# Patient Record
Sex: FEMALE | Race: WHITE | Employment: UNEMPLOYED | URBAN - METROPOLITAN AREA
[De-identification: names, ages, dates, MRNs, and addresses within clinical notes are randomized per-mention and may not be internally consistent; named-entity substitution may affect disease eponyms.]

---

## 2018-01-09 ENCOUNTER — TRANSFERRED RECORDS (OUTPATIENT)
Dept: HEALTH INFORMATION MANAGEMENT | Facility: CLINIC | Age: 18
End: 2018-01-09

## 2018-01-10 ENCOUNTER — RADIANT APPOINTMENT (OUTPATIENT)
Dept: CARDIOLOGY | Facility: CLINIC | Age: 18
End: 2018-01-10
Payer: COMMERCIAL

## 2018-01-10 ENCOUNTER — PRE VISIT (OUTPATIENT)
Dept: CARDIOLOGY | Facility: CLINIC | Age: 18
End: 2018-01-10

## 2018-01-10 ENCOUNTER — OFFICE VISIT (OUTPATIENT)
Dept: CARDIOLOGY | Facility: CLINIC | Age: 18
End: 2018-01-10
Attending: INTERNAL MEDICINE
Payer: COMMERCIAL

## 2018-01-10 VITALS
HEART RATE: 80 BPM | SYSTOLIC BLOOD PRESSURE: 131 MMHG | DIASTOLIC BLOOD PRESSURE: 73 MMHG | HEIGHT: 64 IN | OXYGEN SATURATION: 100 % | BODY MASS INDEX: 19.21 KG/M2 | WEIGHT: 112.5 LBS

## 2018-01-10 DIAGNOSIS — R55 SYNCOPE, UNSPECIFIED SYNCOPE TYPE: ICD-10-CM

## 2018-01-10 DIAGNOSIS — I49.9 IRREGULAR HEART BEAT: Primary | ICD-10-CM

## 2018-01-10 PROCEDURE — 93270 REMOTE 30 DAY ECG REV/REPORT: CPT | Mod: ZF

## 2018-01-10 PROCEDURE — 99203 OFFICE O/P NEW LOW 30 MIN: CPT | Mod: 25 | Performed by: INTERNAL MEDICINE

## 2018-01-10 PROCEDURE — 93010 ELECTROCARDIOGRAM REPORT: CPT | Mod: ZP | Performed by: INTERNAL MEDICINE

## 2018-01-10 PROCEDURE — 93228 REMOTE 30 DAY ECG REV/REPORT: CPT | Performed by: INTERNAL MEDICINE

## 2018-01-10 PROCEDURE — G0463 HOSPITAL OUTPT CLINIC VISIT: HCPCS | Mod: 25,ZF

## 2018-01-10 PROCEDURE — 93005 ELECTROCARDIOGRAM TRACING: CPT | Mod: ZF

## 2018-01-10 RX ORDER — LEVONORGESTREL/ETHIN.ESTRADIOL 0.1-0.02MG
TABLET ORAL
COMMUNITY
Start: 2016-12-27

## 2018-01-10 ASSESSMENT — PAIN SCALES - GENERAL: PAINLEVEL: NO PAIN (0)

## 2018-01-10 NOTE — MR AVS SNAPSHOT
"              After Visit Summary   1/10/2018    Lay Stahl    MRN: [id number]           Patient Information     Date Of Birth          2000        Visit Information        Provider Department      1/10/2018 4:00 PM Tech, Uc Cvc Monitor, [facility]        Today's Diagnoses     Syncope, unspecified syncope type           Follow-ups after your visit        Your next 10 appointments already scheduled     Jan 16, 2018  3:00 PM CST   New Visit with Ac Stock MD   [facility] ([address] [address] [phone]              Who to contact     If you have questions or need follow up information about today's clinic visit or your schedule please contact [facility] directly at [phone].  Normal or non-critical lab and imaging results will be communicated to you by MyChart, letter or phone within 4 business days after the clinic has received the results. If you do not hear from us within 7 days, please contact the clinic through MyChart or phone. If you have a critical or abnormal lab result, we will notify you by phone as soon as possible.  Submit refill requests through Flumes or call your pharmacy and they will forward the refill request to us. Please allow 3 business days for your refill to be completed.          Additional Information About Your Visit        MyChart Information     Flumes lets you send messages to your doctor, view your test results, renew your prescriptions, schedule appointments and more. To sign up, go to [email or web address] . Click on \"Log in\" on the left side of the screen, which will take you to the Welcome page. Then click on \"Sign up Now\" on the right side of the page.     You will be asked to enter the access code listed below, as well as some personal information. Please follow the directions to create your username and password.     Your access code " is: [email or web address]  Expires: 4/10/2018  6:30 AM     Your access code will  in 90 days. If you need help or a new code, please call your [facility] clinic or [phone].        Care EveryWhere ID     This is your Care EveryWhere ID. This could be used by other organizations to access your [facility] medical records  [id number]         Blood Pressure from Last 3 Encounters:   01/10/18 131/73   02/02/15 120/63   10/06/14 112/67    Weight from Last 3 Encounters:   01/10/18 51 kg (112 lb 8 oz) (20 %)*   02/02/15 51 kg (112 lb 7 oz) (33 %)*   10/06/14 49.7 kg (109 lb 9.1 oz) (28 %)*     * Growth percentiles are based on [facility] 2-20 Years data.              We Performed the Following     Event Monitor        Primary Care Provider Office Phone # Fax #    Grant NIKIA Garcia [phone]       [place] [address] [address] [facility] [address]        Equal Access to Services     [facility]: Hadii maty whitmore hadlinden Soleia, waaxda luqadaha, qaybta kaaltrae figueroa, marquise cabrera . So [facility] [phone].    ATENCIÓN: Si habla español, tiene a jaffe disposición servicios gratuitos de asistencia lingüística. [facility] [phone].    We comply with applicable federal civil rights laws and [place] laws. We do not discriminate on the basis of race, color, national origin, age, disability, sex, sexual orientation, or gender identity.            Thank you!     Thank you for choosing [facility]  for your care. Our goal is always to provide you with excellent care. Hearing back from our patients is one way we can continue to improve our services. Please take a few minutes to complete the written survey that you may receive in the mail after your visit with us. Thank you!             Your Updated Medication List - Protect others around you: Learn how to safely use, store and throw away your medicines at www.disposemymeds.org.          This list is accurate as of: 1/10/18  4:17 PM.  Always  "use your most recent med list.                   Brand Name Dispense Instructions for use Diagnosis    etanercept 25 MG vial injection kit    ENBREL    8 each    Reconstiture and inject 50 mg (2 ml) weekly as directed    MARCOS (juvenile idiopathic arthritis), polyarthritis, rheumat factor neg (H)       insulin syringe 31G X 5/16\" 1 ML Misc      1 Box. Use as directed        levonorgestrel-ethinyl estradiol 0.1-20 MG-MCG per tablet    ANDRZEJ PEARSON LESSINA            "

## 2018-01-10 NOTE — LETTER
1/10/2018      RE: Lay Stahl  [address] [facility] [address] [address]       Dear Colleague,    Thank you for the opportunity to participate in the care of your patient, Lay Stahl, at the [facility]. Please see a copy of my visit note below.    [id number]      Please do not hesitate to contact me if you have any questions/concerns.     Sincerely,     Pratik Durham MD

## 2018-01-10 NOTE — MR AVS SNAPSHOT
After Visit Summary   1/10/2018    Lay Stahl    MRN: [id number]           Patient Information     Date Of Birth          2000        Visit Information        Provider Department      1/10/2018 10:30 AM Pratik Durham MD [facility]        Today's Diagnoses     Irregular heart beat    -  1    Syncope, unspecified syncope type          Care Instructions    You were seen today in the Cardiovascular Clinic at the [facility].     Cardiology Providers you saw during your visit: Dr Pratik Durham    Diagnosis: Syncope    Results: Dr Durham reviewed the results of your EKG with you in clinic today    Recommendations:   1.  We will schedule you for an echocardiogram  2.  We will schedule you to have a 30 day event monitor placed.     Follow-up:  Follow up to be determined based on results of testing.       For emergencies call 911.    For any scheduling needs or nursing related questions, please call [phone].    Thank you for your visit today! If you have questions or concerns about today's visit, please call me.      Steve Huang RN  RN Care Coordinator  [facility]  [phone]    Press option 1 for the University and then 3 for nursing related questions                  Follow-ups after your visit        Your next 10 appointments already scheduled     Clive 10, 2018  3:00 PM CST   Ech Complete with [id number]   [facility] ([facility])    [address] [place] [address] [phone]           1. Please bring or wear a comfortable two-piece outfit. 2. You may eat, drink and take your normal medicines. 3. For any questions that cannot be answered, please contact the ordering physician            Clive 10, 2018  4:00 PM CST   (Arrive by 3:45 PM)   Event Monitor Visit with  [facility] ([facility])    [address] [place] [address]  [address]  [facility] [address]   [phone]            2018  3:00 PM CST   New Visit with Ac Stock MD   [facility] ([facility])    [address] [facility] [id number] [phone]              Future tests that were ordered for you today     Open Future Orders        Priority Expected Expires Ordered    Echocardiogram Routine 1/10/2018 1/10/2020 1/10/2018    Event Monitor Routine 1/10/2018 2018 1/10/2018            Who to contact     If you have questions or need follow up information about today's clinic visit or your schedule please contact [facility] directly at [phone].  Normal or non-critical lab and imaging results will be communicated to you by [employer], letter or phone within 4 business days after the clinic has received the results. If you do not hear from us within 7 days, please contact the clinic through [employer] or phone. If you have a critical or abnormal lab result, we will notify you by phone as soon as possible.  Submit refill requests through wiseri or call your pharmacy and they will forward the refill request to us. Please allow 3 business days for your refill to be completed.          Additional Information About Your Visit        [employer]-Rewards Information     wiseri lets you send messages to your doctor, view your test results, renew your prescriptions, schedule appointments and more. To sign up, go to [email or web address] . Click on \"Log in\" on the left side of the screen, which will take you to the Welcome page. Then click on \"Sign up Now\" on the right side of the page.     You will be asked to enter the access code listed below, as well as some personal information. Please follow the directions to create your username and password.     Your access code is: [email or web address]  Expires: 4/10/2018  6:30 AM     Your access code will  in 90 days. If you need help or a new code, please call your " "[facility] or [phone].        Care EveryWhere ID     This is your Care EveryWhere ID. This could be used by other organizations to access your [place] medical records  [id number]        Your Vitals Were     Pulse Height Pulse Oximetry BMI (Body Mass Index)          80 1.626 m (5' 4\") 100% 19.31 kg/m2         Blood Pressure from Last 3 Encounters:   01/10/18 131/73   02/02/15 120/63   10/06/14 112/67    Weight from Last 3 Encounters:   01/10/18 51 kg (112 lb 8 oz) (20 %)*   02/02/15 51 kg (112 lb 7 oz) (33 %)*   10/06/14 49.7 kg (109 lb 9.1 oz) (28 %)*     * Growth percentiles are based on [facility] 2-20 Years data.              We Performed the Following     EKG 12-lead, tracing only (Same Day)        Primary Care Provider Office Phone # Fax #    Grant NIKIA Garcia [phone]       [place] [address] [address] [facility] [address]        Equal Access to Services     [facility]: Hadii maty Davenport, wagisele parker, qahollie figueroa, marquise cabrera . So [facility] [phone].    ATENCIÓN: Si habla español, tiene a jaffe disposición servicios gratuitos de asistencia lingüística. [facility] [phone].    We comply with applicable federal civil rights laws and [place] laws. We do not discriminate on the basis of race, color, national origin, age, disability, sex, sexual orientation, or gender identity.            Thank you!     Thank you for choosing [facility]  for your care. Our goal is always to provide you with excellent care. Hearing back from our patients is one way we can continue to improve our services. Please take a few minutes to complete the written survey that you may receive in the mail after your visit with us. Thank you!             Your Updated Medication List - Protect others around you: Learn how to safely use, store and throw away your medicines at www.disposemymeds.org.          This list is accurate as of: 1/10/18 11:42 AM.  " "Always use your most recent med list.                   Brand Name Dispense Instructions for use Diagnosis    etanercept 25 MG vial injection kit    ENBREL    8 each    Reconstiture and inject 50 mg (2 ml) weekly as directed    MARCOS (juvenile idiopathic arthritis), polyarthritis, rheumat factor neg (H)       insulin syringe 31G X 5/16\" 1 ML Misc      1 Box. Use as directed        levonorgestrel-ethinyl estradiol 0.1-20 MG-MCG per tablet    ANDRZEJ PEARSON LESSINA            "

## 2018-01-10 NOTE — NURSING NOTE
Chief Complaint   Patient presents with     New Patient     heart problem-- 17 year old female presenting for evaluation of irregular heart beat-- needs EKG     Vitals were taken and medications were reconciled. EKG was performed.    Ceci Jackson MA    10:30 AM

## 2018-01-10 NOTE — NURSING NOTE
Per JAMEL Chinchilla, patient to have 30 day cardionet monitor placed.  Diagnosis: syncope  Monitor placed: Yes  Patient Instructed: Yes  Patient verbalized understanding: Yes  Holter # [id number]  Card ID: [id number]  Placed by CLARIBEL Mcmahan

## 2018-01-10 NOTE — PATIENT INSTRUCTIONS
You were seen today in the Cardiovascular Clinic at the [facility].     Cardiology Providers you saw during your visit: Dr Pratik Durham    Diagnosis: Syncope    Results: Dr Durham reviewed the results of your EKG with you in clinic today    Recommendations:   1.  We will schedule you for an echocardiogram  2.  We will schedule you to have a 30 day event monitor placed.     Follow-up:  Follow up to be determined based on results of testing.       For emergencies call 911.    For any scheduling needs or nursing related questions, please call [phone].    Thank you for your visit today! If you have questions or concerns about today's visit, please call me.      Steve Huang RN  RN Care Coordinator  [facility]  [phone]    Press option 1 for the [place] and then 3 for nursing related questions

## 2018-01-10 NOTE — LETTER
January 10, 2018         Grant Garcia MD   [facility]   [address] [place] [address]      RE: Lay Stahl   MRN: [id number]   : 2000       Dear Dr. Garcia:      It was a pleasure participating in the care of your patient, Ms. Lay Stahl.  As you know, she is 17-year-old lady who I see today for syncope.      PAST MEDICAL HISTORY:      1.  Juvenile idiopathic arthritis, LIBERTY positive and rheumatoid factor negative.   2.  She denies other significant hospitalizations or surgeries.      She denies having a significant history of cardiac disease.      In terms of her present symptom complex, she was working at [employer] recently and had a 7:00 a.m. shift.  She had eaten a full breakfast of orange juice, scrambled eggs and a fruit cup, along with a large bottle of water and had had only a granola bar and some water throughout the day.  She had shivers all morning while working.  She then felt sudden onset of nausea at about 2:00 p.m. and shortly thereafter she had stomach problems and her vision began to go in and out.  She turned to tell her tell her supervisor she did not feel well and had to go the bathroom and shortly thereafter passed out.       While her vision was going in and out, she felt like her heart was palpitating rapidly but she could not tell if this was the cause or if she actually just felt panicked.  She says she was out for about 30 seconds, stood up, went to try and go to the bathroom and later sat down, was given Gatorade and water and a granola bar.  She went home, ate a banana, threw up and after that felt completely better.  She has not had any recurrent episodes or symptoms.  She had no diarrhea or fever or other viral type of illness subsequently.  She has been normal since.      Interestingly, her normal condition is such that over half the time she simply stands up from a sitting position, she will have blurry vision and her vision will be fussy.  She has not actually  passed out, except for that one time.  She has never had palpitations or true fainting episodes.      She otherwise is active and denies any significant chest pain or shortness of breath, PND, orthopnea, edema.      In terms of her cardiac risk factors, no history of diabetes or hypertension.  No history of smoking, drinking or drugs.  No family history of heart disease.  She does not know her cholesterol.      She is in college [place] at [place] [employer]  studying chemistry.  She wants to be a .      Her mom and dad are employed in [employer].      CURRENT MEDICATIONS:  Include hormone birth control therapy which has been going smoothly for over a year without new changes.      PHYSICAL EXAMINATION:     VITAL SIGNS:  Blood pressure lying is 126/80 with a pulse 62, sitting 120/76 with a pulse of 74, standing 123/77 with a pulse of 90.  Symptoms were not recreated.  Weight is 112 pounds.   NECK:  Exam reveals a jugular venous pressure that is below the level of the clavicle, no significant hepatojugular reflux.  No carotid bruits.   LUNGS:  Clear to auscultation.  Respiratory effort is normal.    CARDIAC:  Reveals a regular rate and rhythm.  There is a 1-2/6 benign systolic flow murmur.   ABDOMEN:  Belly soft, nontender.   EXTREMITIES:  Without gross edema.      EKG today reveals normal sinus rhythm at a rate of 70 beats per minute, no gross ST changes.      LABORATORY DATA:  On 01/09/2018, hemoglobin, TSH, GFR normal, potassium 4.2.        IMPRESSION:      Lay is a 17-year-old lady without a prior documented history of cardiac disease who presents with a single episode of true syncope.  The underlying etiology for her syncopal episode is not entirely clear; however, in the context of a low body mass index and likely low level of intravascular reserve with a relatively petite overall body makeup, in combination with mild orthostasis at baseline, combined with nausea and stomach upset during the episode,  "certainly could exacerbate a syncopal episode due to a vasovagal type of response.  She had not eaten in approximately 7 hours and had had only a granola bar and a little bit of water.      Her EKG is unremarkable and her exam is unremarkable today; however, due to the unexplained nature of her episode and the fact that she actually did have true syncope and fell to the ground hitting her head, further noninvasive evaluation would be warranted.        PLAN:     1.  Echocardiogram to rule out significant structural pathology.     2.  Event monitor as the patient did complain of possible rapid palpitations during the episode which she could not differentiate between these rapid beats being a cause for her episode versus being \"panicked\" during the episode.     3.  Aggressive hydration and increased salt intake was also recommended.     4.  If the above tests are unremarkable, then no further workup would be indicated at that time.      Once again, it was a pleasure participating in the care of your patient, Ms. Stew Montenegro.  Please feel free to contact me anytime if you have questions regarding her care in the future.      Sincerely,       JAMEL GARCIAS MD        D: 01/10/2018 11:33   T: 2018 19:07   MT: al    Name:     STEW MONTENEGRO   MRN:      [id number]        Account:      [id number]   :      2000           Service Date: 01/10/2018    Document: [id number]    "

## 2018-01-10 NOTE — NURSING NOTE
Cardiac Monitors: Patient was instructed regarding the indication, function, care and prompt return of a event monitor. The monitor was placed on the patient with instructions regarding care of the skin electrodes and monitor, as well as documentation in the patient diary. Patient demonstrated understanding of this information and agreed to call with further questions or concerns.    Cardiac Testing: Patient given instructions regarding  echocardiogram . Discussed purpose, preparation, procedure and when to expect results reported back to the patient. Patient demonstrated understanding of this information and agreed to call with further questions or concerns.    Med Reconcile: Reviewed and verified all current medications with the patient. The updated medication list was printed and given to the patient.    Return Appointment: Follow up with Dr Marva CHOW based on results of testing.  Patient given instructions regarding scheduling next clinic visit. Patient demonstrated understanding of this information and agreed to call with further questions or concerns.    Patient stated she understood all health information given and agreed to call with further questions or concerns.    Steve Huang, RN  RN Care Coordinator  [facility]  [phone]

## 2018-01-11 LAB — INTERPRETATION ECG - MUSE: NORMAL

## 2018-01-12 NOTE — PROGRESS NOTES
January 10, 2018         Grant Garcia MD   [facility]   [address] [place] [address]      RE: Lay Stahl   MRN: [id number]   : 2000       Dear Dr. Garcia:      It was a pleasure participating in the care of your patient, Ms. Lay Stahl.  As you know, she is 17-year-old lady who I see today for syncope.      PAST MEDICAL HISTORY:      1.  Juvenile idiopathic arthritis, LIBERTY positive and rheumatoid factor negative.   2.  She denies other significant hospitalizations or surgeries.      She denies having a significant history of cardiac disease.      In terms of her present symptom complex, she was working at [employer] recently and had a 7:00 a.m. shift.  She had eaten a full breakfast of orange juice, scrambled eggs and a fruit cup, along with a large bottle of water and had had only a granola bar and some water throughout the day.  She had shivers all morning while working.  She then felt sudden onset of nausea at about 2:00 p.m. and shortly thereafter she had stomach problems and her vision began to go in and out.  She turned to tell her tell her supervisor she did not feel well and had to go the bathroom and shortly thereafter passed out.       While her vision was going in and out, she felt like her heart was palpitating rapidly but she could not tell if this was the cause or if she actually just felt panicked.  She says she was out for about 30 seconds, stood up, went to try and go to the bathroom and later sat down, was given Gatorade and water and a granola bar.  She went home, ate a banana, threw up and after that felt completely better.  She has not had any recurrent episodes or symptoms.  She had no diarrhea or fever or other viral type of illness subsequently.  She has been normal since.      Interestingly, her normal condition is such that over half the time she simply stands up from a sitting position, she will have blurry vision and her vision will be fussy.  She has not actually  passed out, except for that one time.  She has never had palpitations or true fainting episodes.      She otherwise is active and denies any significant chest pain or shortness of breath, PND, orthopnea, edema.      In terms of her cardiac risk factors, no history of diabetes or hypertension.  No history of smoking, drinking or drugs.  No family history of heart disease.  She does not know her cholesterol.      She is in college here at [employer]  studying chemistry.  She wants to be a .      Her mom and dad are employed in [employer] and [employer].      CURRENT MEDICATIONS:  Include hormone birth control therapy which has been going smoothly for over a year without new changes.      PHYSICAL EXAMINATION:     VITAL SIGNS:  Blood pressure lying is 126/80 with a pulse 62, sitting 120/76 with a pulse of 74, standing 123/77 with a pulse of 90.  Symptoms were not recreated.  Weight is 112 pounds.   NECK:  Exam reveals a jugular venous pressure that is below the level of the clavicle, no significant hepatojugular reflux.  No carotid bruits.   LUNGS:  Clear to auscultation.  Respiratory effort is normal.    CARDIAC:  Reveals a regular rate and rhythm.  There is a 1-2/6 benign systolic flow murmur.   ABDOMEN:  Belly soft, nontender.   EXTREMITIES:  Without gross edema.      EKG today reveals normal sinus rhythm at a rate of 70 beats per minute, no gross ST changes.      LABORATORY DATA:  On 01/09/2018, hemoglobin, TSH, GFR normal, potassium 4.2.        IMPRESSION:      Lay is a 17-year-old lady without a prior documented history of cardiac disease who presents with a single episode of true syncope.  The underlying etiology for her syncopal episode is not entirely clear; however, in the context of a low body mass index and likely low level of intravascular reserve with a relatively petite overall body makeup, in combination with mild orthostasis at baseline, combined with nausea and stomach upset during the episode,  "certainly could exacerbate a syncopal episode due to a vasovagal type of response.  She had not eaten in approximately 7 hours and had had only a granola bar and a little bit of water.      Her EKG is unremarkable and her exam is unremarkable today; however, due to the unexplained nature of her episode and the fact that she actually did have true syncope and fell to the ground hitting her head, further noninvasive evaluation would be warranted.        PLAN:     1.  Echocardiogram to rule out significant structural pathology.     2.  Event monitor as the patient did complain of possible rapid palpitations during the episode which she could not differentiate between these rapid beats being a cause for her episode versus being \"panicked\" during the episode.     3.  Aggressive hydration and increased salt intake was also recommended.     4.  If the above tests are unremarkable, then no further workup would be indicated at that time.      Once again, it was a pleasure participating in the care of your patient, Ms. Stew Montenegro.  Please feel free to contact me anytime if you have questions regarding her care in the future.      Sincerely,         JAMEL GARCIAS MD      Addendum:    Echo unremarkable    Event monitor does not reveal significant arrythmias    F/u primary MD, consider further eval if additional events or worsening symptoms           D: 01/10/2018 11:33   T: 2018 19:07   MT: al      Name:     STEW MONTENEGRO   MRN:      [id number]        Account:      [id number]   :      2000           Service Date: 01/10/2018      Document: [id number]      "